# Patient Record
Sex: FEMALE | Race: WHITE | URBAN - METROPOLITAN AREA
[De-identification: names, ages, dates, MRNs, and addresses within clinical notes are randomized per-mention and may not be internally consistent; named-entity substitution may affect disease eponyms.]

---

## 2023-01-13 ENCOUNTER — TRANSFERRED RECORDS (OUTPATIENT)
Dept: HEALTH INFORMATION MANAGEMENT | Facility: CLINIC | Age: 28
End: 2023-01-13

## 2023-02-15 ENCOUNTER — MEDICAL CORRESPONDENCE (OUTPATIENT)
Dept: HEALTH INFORMATION MANAGEMENT | Facility: CLINIC | Age: 28
End: 2023-02-15

## 2023-02-15 ENCOUNTER — TRANSCRIBE ORDERS (OUTPATIENT)
Dept: MATERNAL FETAL MEDICINE | Facility: CLINIC | Age: 28
End: 2023-02-15

## 2023-02-15 DIAGNOSIS — O26.90 PREGNANCY RELATED CONDITION, ANTEPARTUM: Primary | ICD-10-CM

## 2023-02-17 DIAGNOSIS — O99.891 PREGNANCY COMPLICATION BEFORE BIRTH: ICD-10-CM

## 2023-02-17 DIAGNOSIS — B19.20 HEPATITIS C: Primary | ICD-10-CM

## 2023-02-28 ENCOUNTER — TELEPHONE (OUTPATIENT)
Dept: MATERNAL FETAL MEDICINE | Facility: CLINIC | Age: 28
End: 2023-02-28

## 2023-02-28 DIAGNOSIS — B19.20 HEPATITIS C: Primary | ICD-10-CM

## 2023-02-28 NOTE — TELEPHONE ENCOUNTER
BE Gleason to call [facility]FM RN at [phone] regarding referral. Pt needs to see GI for new Hep C diagnosis as well.    Zoila Ferguson RN